# Patient Record
Sex: MALE | Race: WHITE | NOT HISPANIC OR LATINO | URBAN - METROPOLITAN AREA
[De-identification: names, ages, dates, MRNs, and addresses within clinical notes are randomized per-mention and may not be internally consistent; named-entity substitution may affect disease eponyms.]

---

## 2018-06-22 ENCOUNTER — TELEPHONE (OUTPATIENT)
Dept: GASTROENTEROLOGY | Facility: CLINIC | Age: 43
End: 2018-06-22

## 2018-06-22 NOTE — TELEPHONE ENCOUNTER
Spoke with patients wife . Offered her appointment on 07/13, she declined and stated that it was to far away. I provided her with [facility] in [place] to see if they have availability. Also advised her to take patient to the Emergency room due to abdominal pain, swelling, and also bleeding.    She verbalizes understanding.